# Patient Record
Sex: MALE | Race: WHITE | NOT HISPANIC OR LATINO | Employment: OTHER | URBAN - NONMETROPOLITAN AREA
[De-identification: names, ages, dates, MRNs, and addresses within clinical notes are randomized per-mention and may not be internally consistent; named-entity substitution may affect disease eponyms.]

---

## 2018-04-09 ENCOUNTER — OFFICE VISIT (OUTPATIENT)
Dept: CARDIOLOGY | Facility: CLINIC | Age: 83
End: 2018-04-09

## 2018-04-09 VITALS
HEART RATE: 64 BPM | SYSTOLIC BLOOD PRESSURE: 122 MMHG | BODY MASS INDEX: 30.34 KG/M2 | WEIGHT: 224 LBS | HEIGHT: 72 IN | DIASTOLIC BLOOD PRESSURE: 70 MMHG

## 2018-04-09 DIAGNOSIS — I25.6 SILENT MYOCARDIAL ISCHEMIA: ICD-10-CM

## 2018-04-09 DIAGNOSIS — I10 ESSENTIAL HYPERTENSION: Primary | ICD-10-CM

## 2018-04-09 DIAGNOSIS — E11.9 TYPE 2 DIABETES MELLITUS WITHOUT COMPLICATION, WITHOUT LONG-TERM CURRENT USE OF INSULIN (HCC): ICD-10-CM

## 2018-04-09 DIAGNOSIS — R06.02 SHORTNESS OF BREATH: ICD-10-CM

## 2018-04-09 DIAGNOSIS — R53.83 OTHER FATIGUE: ICD-10-CM

## 2018-04-09 DIAGNOSIS — N28.9 RENAL INSUFFICIENCY: ICD-10-CM

## 2018-04-09 DIAGNOSIS — E78.00 PURE HYPERCHOLESTEROLEMIA: ICD-10-CM

## 2018-04-09 DIAGNOSIS — E66.9 OBESITY (BMI 30.0-34.9): ICD-10-CM

## 2018-04-09 PROCEDURE — 99214 OFFICE O/P EST MOD 30 MIN: CPT | Performed by: NURSE PRACTITIONER

## 2018-04-09 RX ORDER — VALSARTAN 40 MG/1
40 TABLET ORAL DAILY
COMMUNITY
End: 2018-10-08

## 2018-04-09 RX ORDER — PRAVASTATIN SODIUM 40 MG
40 TABLET ORAL DAILY
COMMUNITY

## 2018-04-09 RX ORDER — FUROSEMIDE 40 MG/1
40 TABLET ORAL DAILY
COMMUNITY

## 2018-04-09 RX ORDER — CLONIDINE HYDROCHLORIDE 0.2 MG/1
0.2 TABLET ORAL 2 TIMES DAILY
COMMUNITY

## 2018-04-09 RX ORDER — PIOGLITAZONEHYDROCHLORIDE 45 MG/1
45 TABLET ORAL DAILY
COMMUNITY

## 2018-04-09 RX ORDER — LISINOPRIL 40 MG/1
40 TABLET ORAL DAILY
COMMUNITY
End: 2018-10-08 | Stop reason: SDUPTHER

## 2018-04-09 RX ORDER — AMLODIPINE BESYLATE 10 MG/1
10 TABLET ORAL DAILY
COMMUNITY
End: 2019-04-08 | Stop reason: ALTCHOICE

## 2018-04-18 ENCOUNTER — HOSPITAL ENCOUNTER (OUTPATIENT)
Dept: CARDIOLOGY | Facility: HOSPITAL | Age: 83
Discharge: HOME | End: 2018-04-18

## 2018-04-18 ENCOUNTER — OUTSIDE FACILITY SERVICE (OUTPATIENT)
Dept: CARDIOLOGY | Facility: CLINIC | Age: 83
End: 2018-04-18

## 2018-04-18 ENCOUNTER — DOCUMENTATION (OUTPATIENT)
Dept: CARDIOLOGY | Facility: HOSPITAL | Age: 83
End: 2018-04-18

## 2018-04-18 DIAGNOSIS — R06.02 SHORTNESS OF BREATH: ICD-10-CM

## 2018-04-18 DIAGNOSIS — I25.6 SILENT MYOCARDIAL ISCHEMIA: ICD-10-CM

## 2018-04-18 DIAGNOSIS — I10 ESSENTIAL HYPERTENSION: ICD-10-CM

## 2018-04-18 DIAGNOSIS — E11.9 TYPE 2 DIABETES MELLITUS WITHOUT COMPLICATION, WITHOUT LONG-TERM CURRENT USE OF INSULIN (HCC): ICD-10-CM

## 2018-04-18 LAB
MAXIMAL PREDICTED HEART RATE: 139 BPM
STRESS TARGET HR: 118 BPM

## 2018-04-18 PROCEDURE — 93306 TTE W/DOPPLER COMPLETE: CPT | Performed by: INTERNAL MEDICINE

## 2018-04-18 PROCEDURE — 93306 TTE W/DOPPLER COMPLETE: CPT

## 2018-04-18 PROCEDURE — 25010000002 REGADENOSON 0.4 MG/5ML SOLUTION

## 2018-04-18 NOTE — PROGRESS NOTES
PT PRESENTS TODAY (4/18/18) FOR NUCLEAR STRESS TEST.  PT UNABLE TO TOLERATE TEST DUE TO CLAUSTROPHOBIA.

## 2018-10-08 ENCOUNTER — OFFICE VISIT (OUTPATIENT)
Dept: CARDIOLOGY | Facility: CLINIC | Age: 83
End: 2018-10-08

## 2018-10-08 VITALS
SYSTOLIC BLOOD PRESSURE: 130 MMHG | WEIGHT: 225 LBS | HEART RATE: 52 BPM | DIASTOLIC BLOOD PRESSURE: 60 MMHG | HEIGHT: 72 IN | BODY MASS INDEX: 30.48 KG/M2

## 2018-10-08 DIAGNOSIS — E78.00 PURE HYPERCHOLESTEROLEMIA: ICD-10-CM

## 2018-10-08 DIAGNOSIS — E11.9 TYPE 2 DIABETES MELLITUS WITHOUT COMPLICATION, WITHOUT LONG-TERM CURRENT USE OF INSULIN (HCC): ICD-10-CM

## 2018-10-08 DIAGNOSIS — I10 ESSENTIAL HYPERTENSION: Primary | ICD-10-CM

## 2018-10-08 DIAGNOSIS — I49.3 PVC (PREMATURE VENTRICULAR CONTRACTION): ICD-10-CM

## 2018-10-08 PROCEDURE — 99213 OFFICE O/P EST LOW 20 MIN: CPT | Performed by: NURSE PRACTITIONER

## 2018-10-08 PROCEDURE — 93000 ELECTROCARDIOGRAM COMPLETE: CPT | Performed by: NURSE PRACTITIONER

## 2018-10-08 RX ORDER — LISINOPRIL 40 MG/1
40 TABLET ORAL DAILY
Qty: 135 TABLET | Refills: 3 | Status: SHIPPED | OUTPATIENT
Start: 2018-10-08 | End: 2019-04-08 | Stop reason: DRUGHIGH

## 2018-10-08 NOTE — PROGRESS NOTES
"Chief Complaint   Patient presents with   • Follow-up     Cardiac management. Last labs per PCP. PCP writes refills on medication. He reports was unable to tolerate stress test.   • Shortness of Breath     He states with exertion.   • Dizziness     Only if he gets up to fast.   • Irregular Heart Beat     Noted irregular rate when obtaining pulse.       Reginaldo Morales is an 83 y.o. male with HTN, hyperlipidemia, and DM.  In the past he reported chest pain and shortness of breath, but cardiac work up did not show any ischemia with normal LV function. He returned in April 2018 after not being seen since 2015. He complained of worsening shortness of breath with exertion and cardiac work up was recommended. He was unable to complete the stress test secondary to not being able to lie on the table. Echocardiogram was done which showed normal LVEF and PA pressure 34 mmHg. He came in today for his regular follow up. Overall, he feels stable. Denies any chest pain, does have mild shortness of breath with exertion. He has difficulty seeing secondary to macular degeneration and admits to \"not doing much.\" Labs are followed closely with Dr. Loya. Morning fasting glucoses run around 100. He does not know recent A1C or lipids but states Dr. Loya tells him everything looks good. His wife reports they received a letter recalling valsartan but has not stopped yet. He is also noted to be on ace. No refills needed.     HPI         Cardiac History:    Past Surgical History:   Procedure Laterality Date   • CARDIOVASCULAR STRESS TEST  07/24/2013    EF 60-65%, no ischemia   • CARDIOVASCULAR STRESS TEST  04/18/2018    Pt refused   • ECHO - CONVERTED  07/24/2013    EF 60-65%, LVH, mild MR, mild AR   • ECHO - CONVERTED  04/18/2018    EF 65%. RVSP- 34 mmHg   • OTHER SURGICAL HISTORY  07/18/2013    Overnight oximetry:  positive, low SP02 80%       Current Outpatient Prescriptions   Medication Sig Dispense Refill   • amLODIPine " (NORVASC) 10 MG tablet Take 10 mg by mouth Daily.     • CloNIDine (CATAPRES) 0.2 MG tablet Take 0.2 mg by mouth 2 (Two) Times a Day.     • furosemide (LASIX) 40 MG tablet Take 40 mg by mouth Daily.     • lisinopril (PRINIVIL,ZESTRIL) 40 MG tablet Take 1 tablet by mouth Daily. Increase to 40 mg in the morning and 20 mg at night 135 tablet 3   • pioglitazone (ACTOS) 45 MG tablet Take 45 mg by mouth Daily.     • pravastatin (PRAVACHOL) 40 MG tablet Take 40 mg by mouth Daily.     • sitaGLIPtin-metFORMIN (JANUMET)  MG per tablet Take 1 tablet by mouth 2 (Two) Times a Day With Meals.       No current facility-administered medications for this visit.        Penicillins    Past Medical History:   Diagnosis Date   • Basal cell adenoma    • Diabetes mellitus (CMS/HCC)    • Hx of cataract surgery     left eye   • Hydronephrosis    • Hyperlipidemia    • Hypertension    • Kidney atrophy     left   • Macular degeneration (senile) of retina    • Malignant neoplasm of prostate (CMS/HCC)    • Meatal stenosis        Social History     Social History   • Marital status:      Spouse name: N/A   • Number of children: N/A   • Years of education: N/A     Occupational History   • Not on file.     Social History Main Topics   • Smoking status: Former Smoker     Quit date: 4/9/1981   • Smokeless tobacco: Never Used   • Alcohol use No   • Drug use: No   • Sexual activity: Not on file     Other Topics Concern   • Not on file     Social History Narrative   • No narrative on file       Family History   Problem Relation Age of Onset   • Heart attack Father    • Diabetes Father        Review of Systems   Constitution: Positive for weight gain (up 1 lb). Negative for decreased appetite, weakness and malaise/fatigue.   HENT: Negative.    Eyes: Positive for vision loss in left eye and vision loss in right eye.   Cardiovascular: Positive for dyspnea on exertion (mild ) and leg swelling (more at the end of day). Negative for chest pain,  "claudication, palpitations and syncope.   Respiratory: Positive for shortness of breath (mild ). Negative for cough.    Endocrine: Negative.    Hematologic/Lymphatic: Negative.    Skin: Negative.    Musculoskeletal: Positive for arthritis and back pain. Negative for falls and myalgias.   Gastrointestinal: Positive for constipation (occasinoally, uses Miralax PRN). Negative for abdominal pain and melena.   Genitourinary: Negative for dysuria and hematuria.   Neurological: Positive for light-headedness (occasionally with standing ). Negative for dizziness.   Psychiatric/Behavioral: Negative for altered mental status and depression.   Allergic/Immunologic: Negative.       Diabetes- Yes  Thyroid-normal    Objective     /60 (BP Location: Right arm)   Pulse 52   Ht 182.9 cm (72.01\")   Wt 102 kg (225 lb)   BMI 30.51 kg/m²     Physical Exam   Constitutional: He is oriented to person, place, and time. He appears well-developed and well-nourished. No distress.   HENT:   Head: Normocephalic.   Eyes: Pupils are equal, round, and reactive to light.   Neck: Normal range of motion.   Cardiovascular: Normal rate, regular rhythm and intact distal pulses.   Occasional extrasystoles are present.   Murmur heard.  Pulmonary/Chest: Effort normal and breath sounds normal.   Abdominal: Soft. Bowel sounds are normal. He exhibits no distension.   Musculoskeletal: Normal range of motion. He exhibits edema (trace to 1+ bilateral LE edema).   Neurological: He is alert and oriented to person, place, and time.   Skin: Skin is warm and dry. He is not diaphoretic.   Psychiatric: He has a normal mood and affect.   Nursing note and vitals reviewed.       ECG 12 Lead  Date/Time: 10/8/2018 9:46 AM  Performed by: OWEN THOMPSON  Authorized by: OWEN THOMPSON   Previous ECG: no previous ECG available  Rhythm: sinus rhythm  Ectopy: PVCs  Rate: bradycardic  BPM: 52  Conduction: right bundle branch block  Comments:  ms   ms  One " PVC                Assessment/Plan    Heart rate and blood pressure stable. He is mildly bradycardic and irregular rhythm noted on clinical exam. EKG showed sinus bradycardia at 52 BPM, RBBB, and one PVC. He is advised to discontinue valsartan secondary to recall as well as concomitant treatment with ace inhibitor to prevent hyperkalemia. He is advised to increase lisinopril to 40 mg in am and 20 mg in pm. Refill sent for new dose. His wife will monitor blood pressure and let us know if not controlled. Amlodipine may be changed to Procardia or could consider adding hydralazine. He was advised to limit sodium as he does admit to adding extra salt. Could we get a copy of next labs? We reviewed the echocardiogram. He was unable to complete stress secondary to claustrophobia. He is advised to contact our office if he develops any new symptoms. Continue conservative management.He is not taking aspirin.  We will see him back in six months or sooner if needed.   Morgan was seen today for follow-up, shortness of breath, dizziness and irregular heart beat.    Diagnoses and all orders for this visit:    Essential hypertension    Pure hypercholesterolemia    Type 2 diabetes mellitus without complication, without long-term current use of insulin (CMS/[facility])    PVC (premature ventricular contraction)    Other orders  -     lisinopril (PRINIVIL,ZESTRIL) 40 MG tablet; Take 1 tablet by mouth Daily. Increase to 40 mg in the morning and 20 mg at night  -     ECG 12 Lead        Patient's Body mass index is 30.51 kg/m². BMI is above normal parameters. Recommendations include: nutrition counseling.               Electronically signed by LEE Coates,  October 8, 2018 9:54 AM

## 2019-04-08 ENCOUNTER — OFFICE VISIT (OUTPATIENT)
Dept: CARDIOLOGY | Facility: CLINIC | Age: 84
End: 2019-04-08

## 2019-04-08 VITALS
HEART RATE: 60 BPM | HEIGHT: 72 IN | SYSTOLIC BLOOD PRESSURE: 138 MMHG | DIASTOLIC BLOOD PRESSURE: 60 MMHG | BODY MASS INDEX: 31.15 KG/M2 | WEIGHT: 230 LBS

## 2019-04-08 DIAGNOSIS — I10 ESSENTIAL HYPERTENSION: ICD-10-CM

## 2019-04-08 DIAGNOSIS — I49.3 PVC (PREMATURE VENTRICULAR CONTRACTION): Primary | ICD-10-CM

## 2019-04-08 DIAGNOSIS — E11.9 TYPE 2 DIABETES MELLITUS WITHOUT COMPLICATION, WITHOUT LONG-TERM CURRENT USE OF INSULIN (HCC): ICD-10-CM

## 2019-04-08 DIAGNOSIS — E78.00 PURE HYPERCHOLESTEROLEMIA: ICD-10-CM

## 2019-04-08 DIAGNOSIS — G47.34 NOCTURNAL OXYGEN DESATURATION: ICD-10-CM

## 2019-04-08 DIAGNOSIS — E66.9 OBESITY (BMI 30.0-34.9): ICD-10-CM

## 2019-04-08 DIAGNOSIS — R40.0 HAS DAYTIME DROWSINESS: ICD-10-CM

## 2019-04-08 PROCEDURE — 99213 OFFICE O/P EST LOW 20 MIN: CPT | Performed by: NURSE PRACTITIONER

## 2019-04-08 RX ORDER — NIFEDIPINE 60 MG/1
60 TABLET, EXTENDED RELEASE ORAL DAILY
COMMUNITY

## 2019-04-08 RX ORDER — LISINOPRIL 40 MG/1
40 TABLET ORAL DAILY
COMMUNITY
End: 2019-10-10 | Stop reason: SDUPTHER

## 2019-04-08 NOTE — PROGRESS NOTES
Chief Complaint   Patient presents with   • Follow-up     For cardiac management. Patient is not on aspirin. Last lab was done a couple of months ago per PCP, not in chart. Reports that he sleeps a lot.   • Med Refill     PCP does refills as well.       Cardiac Complaints  none      Subjective   Morgan Morales is a 84 y.o. male with HTN, hyperlipidemia, and DM.  In the past he reported chest pain and shortness of breath, but cardiac work up did not show any ischemia with normal LV function. He returned in April 2018 after not being seen since 2015. He complained of worsening shortness of breath with exertion and cardiac work up was recommended. He was unable to complete the stress test secondary to not being able to lie on the table. Echocardiogram was done which showed normal LVEF and PA pressure 34 mmHg. He returns today for follow up and denies any cardiac concerns.  He denies any chest pain, shortness of breath, and dizziness.  Patient does report sleeping quite a bit and having daytime drowsiness but admits this has been the same for sometime.  He does have nocturnal desaturation and wears oxygen at night but no CPAP.  He does not think he has been diagnosed with sleep apnea.  Labs he admits are done with PCP and were done a few months ago, no copy available.  No refills needed as he admits they are done with PCP.      Cardiac History  Past Surgical History:   Procedure Laterality Date   • CARDIOVASCULAR STRESS TEST  07/24/2013    EF 60-65%, no ischemia   • CARDIOVASCULAR STRESS TEST  04/18/2018    Pt refused   • ECHO - CONVERTED  07/24/2013    EF 60-65%, LVH, mild MR, mild AR   • ECHO - CONVERTED  04/18/2018    EF 65%. RVSP- 34 mmHg   • OTHER SURGICAL HISTORY  07/18/2013    Overnight oximetry:  positive, low SP02 80%       Current Outpatient Medications   Medication Sig Dispense Refill   • CloNIDine (CATAPRES) 0.2 MG tablet Take 0.2 mg by mouth 2 (Two) Times a Day.     • furosemide (LASIX) 40 MG tablet Take 40  mg by mouth Daily.     • lisinopril (PRINIVIL,ZESTRIL) 40 MG tablet Take 40 mg by mouth Daily.     • NIFEdipine XL (PROCARDIA XL) 60 MG 24 hr tablet Take 60 mg by mouth Daily.     • pioglitazone (ACTOS) 45 MG tablet Take 45 mg by mouth Daily.     • pravastatin (PRAVACHOL) 40 MG tablet Take 40 mg by mouth Daily.     • sitaGLIPtin-metFORMIN (JANUMET)  MG per tablet Take 1 tablet by mouth 2 (Two) Times a Day With Meals.       No current facility-administered medications for this visit.        Penicillins    Past Medical History:   Diagnosis Date   • Basal cell adenoma    • Diabetes mellitus (CMS/HCC)    • Hx of cataract surgery     left eye   • Hydronephrosis    • Hyperlipidemia    • Hypertension    • Kidney atrophy     left   • Macular degeneration (senile) of retina    • Malignant neoplasm of prostate (CMS/HCC)    • Meatal stenosis        Social History     Socioeconomic History   • Marital status:      Spouse name: Not on file   • Number of children: Not on file   • Years of education: Not on file   • Highest education level: Not on file   Tobacco Use   • Smoking status: Former Smoker     Last attempt to quit: 1981     Years since quittin.0   • Smokeless tobacco: Never Used   Substance and Sexual Activity   • Alcohol use: No   • Drug use: No       Family History   Problem Relation Age of Onset   • Heart attack Father    • Diabetes Father        Review of Systems   Constitution: Positive for malaise/fatigue. Negative for weakness.   Cardiovascular: Negative for chest pain, claudication, dyspnea on exertion, irregular heartbeat, near-syncope, orthopnea, palpitations and syncope.   Respiratory: Negative for cough, shortness of breath and wheezing.    Musculoskeletal: Negative for back pain, joint pain and joint swelling.   Gastrointestinal: Negative for anorexia, heartburn, nausea and vomiting.   Genitourinary: Negative for dysuria, hematuria, hesitancy and nocturia.   Neurological: Positive for  "excessive daytime sleepiness. Negative for dizziness, light-headedness and loss of balance.   Psychiatric/Behavioral: Negative for depression and memory loss. The patient is not nervous/anxious.            Objective     /60 (BP Location: Left arm)   Pulse 60   Ht 182.9 cm (72.01\")   Wt 104 kg (230 lb)   BMI 31.19 kg/m²     Physical Exam   Constitutional: He is oriented to person, place, and time. He appears well-developed and well-nourished.   HENT:   Head: Normocephalic and atraumatic.   Eyes: EOM are normal. Pupils are equal, round, and reactive to light.   Neck: Normal range of motion. Neck supple.   Cardiovascular: Normal rate and regular rhythm.   Murmur heard.  Pulmonary/Chest: Effort normal and breath sounds normal.   Abdominal: Soft.   Musculoskeletal: Normal range of motion.   Neurological: He is alert and oriented to person, place, and time.   Skin: Skin is warm and dry.   Psychiatric: He has a normal mood and affect. His behavior is normal.       Procedures    Assessment/Plan     HR and BP are stable today.  HTN well managed on current, same advised.  Palpitations denied at present, no additional medication recommended for management but patient was encouraged to limit his caffeine.  He does admit to daytime drowiness and fatigue and did have a positive overnight years ago with low SP02 of 80% noted.  He wears oxygen at night but does not use CPAP and denies sleep study.  Patient encouraged to discuss with your office.  Labs including his FLP for hyperlipidemia management and pravachol efficacy as well as AIC for DM management done with your office.  Could we have next for review?  No refills currently needed as he reports with your office.  No repeat cardiac workup recommended as cardiac symptoms are denied.  BMI elevated at 31.19 and patient does admit to high carb diet.  Good cardiac ADA diet with limited carbs/sugars/and caloric intake encouraged.  6 month follow up scheduled or sooner if " needed.         Problems Addressed this Visit        Cardiovascular and Mediastinum    Essential hypertension    Relevant Medications    lisinopril (PRINIVIL,ZESTRIL) 40 MG tablet    NIFEdipine XL (PROCARDIA XL) 60 MG 24 hr tablet    Pure hypercholesterolemia    PVC (premature ventricular contraction) - Primary    Relevant Medications    NIFEdipine XL (PROCARDIA XL) 60 MG 24 hr tablet       Endocrine    Type 2 diabetes mellitus without complication, without long-term current use of insulin (CMS/[facility])      Other Visit Diagnoses     Nocturnal oxygen desaturation        Obesity (BMI 30.0-34.9)        Has daytime drowsiness              Patient's Body mass index is 31.19 kg/m². BMI is above normal parameters. Recommendations include: nutrition counseling.            Electronically signed by LEE Boudreaux April 8, 2019 4:41 PM

## 2019-10-10 RX ORDER — LISINOPRIL 40 MG/1
40 TABLET ORAL DAILY
Qty: 30 TABLET | Refills: 0 | Status: SHIPPED | OUTPATIENT
Start: 2019-10-10 | End: 2019-12-10 | Stop reason: SDUPTHER

## 2019-11-07 RX ORDER — LISINOPRIL 40 MG/1
TABLET ORAL
Qty: 30 TABLET | Refills: 0 | OUTPATIENT
Start: 2019-11-07

## 2019-12-10 ENCOUNTER — OFFICE VISIT (OUTPATIENT)
Dept: CARDIOLOGY | Facility: CLINIC | Age: 84
End: 2019-12-10

## 2019-12-10 VITALS
SYSTOLIC BLOOD PRESSURE: 150 MMHG | HEIGHT: 72 IN | HEART RATE: 68 BPM | DIASTOLIC BLOOD PRESSURE: 72 MMHG | WEIGHT: 230.8 LBS | BODY MASS INDEX: 31.26 KG/M2

## 2019-12-10 DIAGNOSIS — E78.00 PURE HYPERCHOLESTEROLEMIA: ICD-10-CM

## 2019-12-10 DIAGNOSIS — I49.3 PVC (PREMATURE VENTRICULAR CONTRACTION): ICD-10-CM

## 2019-12-10 DIAGNOSIS — E66.9 OBESITY (BMI 30.0-34.9): ICD-10-CM

## 2019-12-10 DIAGNOSIS — I10 ESSENTIAL HYPERTENSION: ICD-10-CM

## 2019-12-10 DIAGNOSIS — E11.9 TYPE 2 DIABETES MELLITUS WITHOUT COMPLICATION, WITHOUT LONG-TERM CURRENT USE OF INSULIN (HCC): ICD-10-CM

## 2019-12-10 DIAGNOSIS — G47.34 NOCTURNAL OXYGEN DESATURATION: ICD-10-CM

## 2019-12-10 PROCEDURE — 99213 OFFICE O/P EST LOW 20 MIN: CPT | Performed by: NURSE PRACTITIONER

## 2019-12-10 RX ORDER — LISINOPRIL 40 MG/1
40 TABLET ORAL DAILY
Qty: 30 TABLET | Refills: 8 | Status: SHIPPED | OUTPATIENT
Start: 2019-12-10 | End: 2019-12-11 | Stop reason: SDUPTHER

## 2019-12-10 NOTE — PROGRESS NOTES
Chief Complaint   Patient presents with   • Follow-up     cardiac management. Had labs per PCP August 0-[id number] 2019.. No cardiac complaints today   • Edema     Has mild  edema to ankles .   • Med Refill     Needs refills onLisinopril -90 day supply to [facility] pharmacy       Subjective       Morgan Morales is a 84 y.o. male  with HTN, hyperlipidemia, and DM.  In the past he reported chest pain and shortness of breath, but cardiac work up did not show any ischemia with normal LV function. He returned in April 2018 after not being seen since 2015. He complained of worsening shortness of breath with exertion and cardiac work up was recommended. He was unable to complete the stress test secondary to not being able to lie on the table. Echocardiogram was done which showed normal LVEF and PA pressure 34 mmHg. He does have nocturnal desaturation and wears oxygen at night but no CPAP.    Today he comes to the office for a follow up visit accompanied by his wife.  He denies chest pain or palpitations.  No issues with increased shortness of breath, dizziness, or increased swelling.  He maintains his normal daily activities.  No recent change in cardiac medications noted.  His wife feels he is doing fairly well.  Some days his blood sugar is higher than others. This morning 123.     HPI     Cardiac History:    Past Surgical History:   Procedure Laterality Date   • CARDIOVASCULAR STRESS TEST  07/24/2013    EF 60-65%, no ischemia   • CARDIOVASCULAR STRESS TEST  04/18/2018    Pt refused   • ECHO - CONVERTED  07/24/2013    EF 60-65%, LVH, mild MR, mild AR   • ECHO - CONVERTED  04/18/2018    EF 65%. RVSP- 34 mmHg   • OTHER SURGICAL HISTORY  07/18/2013    Overnight oximetry:  positive, low SP02 80%       Current Outpatient Medications   Medication Sig Dispense Refill   • CloNIDine (CATAPRES) 0.2 MG tablet Take 0.2 mg by mouth 2 (Two) Times a Day.     • furosemide (LASIX) 40 MG tablet Take 40 mg by mouth Daily.     •  lisinopril (PRINIVIL,ZESTRIL) 40 MG tablet Take 1 tablet by mouth Daily. 30 tablet 8   • NIFEdipine XL (PROCARDIA XL) 60 MG 24 hr tablet Take 60 mg by mouth Daily.     • pioglitazone (ACTOS) 45 MG tablet Take 45 mg by mouth Daily.     • pravastatin (PRAVACHOL) 40 MG tablet Take 40 mg by mouth Daily.     • sitaGLIPtin-metFORMIN (JANUMET)  MG per tablet Take 1 tablet by mouth 2 (Two) Times a Day With Meals.       No current facility-administered medications for this visit.        Penicillins    Past Medical History:   Diagnosis Date   • Basal cell adenoma    • Diabetes mellitus (CMS/HCC)    • Hx of cataract surgery     left eye   • Hydronephrosis    • Hyperlipidemia    • Hypertension    • Kidney atrophy     left   • Macular degeneration (senile) of retina    • Malignant neoplasm of prostate (CMS/HCC)    • Meatal stenosis        Social History     Socioeconomic History   • Marital status:      Spouse name: Not on file   • Number of children: Not on file   • Years of education: Not on file   • Highest education level: Not on file   Tobacco Use   • Smoking status: Former Smoker     Last attempt to quit: 1981     Years since quittin.6   • Smokeless tobacco: Never Used   Substance and Sexual Activity   • Alcohol use: No   • Drug use: No       Family History   Problem Relation Age of Onset   • Heart attack Father    • Diabetes Father        Review of Systems   Constitution: Positive for malaise/fatigue (same). Negative for decreased appetite, diaphoresis and fever.   HENT: Positive for hearing loss. Negative for congestion, hoarse voice and nosebleeds.    Eyes: Negative for redness and visual disturbance.   Cardiovascular: Positive for leg swelling (mild, lower legs, not a new problem). Negative for chest pain and near-syncope.   Respiratory: Positive for shortness of breath and sleep disturbances due to breathing (uses oxygen).    Endocrine: Negative for polydipsia, polyphagia and polyuria.  "  Hematologic/Lymphatic: Negative for bleeding problem. Does not bruise/bleed easily.   Skin: Positive for dry skin and itching. Negative for flushing and rash.   Musculoskeletal: Positive for arthritis. Negative for falls, muscle cramps and muscle weakness.   Gastrointestinal: Negative for abdominal pain, dysphagia and melena.   Genitourinary: Negative for dysuria and hematuria.   Psychiatric/Behavioral: Positive for memory loss (\"forgetful\"). Negative for altered mental status. The patient is not nervous/anxious.    Allergic/Immunologic: Negative for hives.        Objective     /72 (BP Location: Left arm)   Pulse 68   Ht 182.9 cm (72.01\")   Wt 105 kg (230 lb 12.8 oz)   BMI 31.29 kg/m²     Physical Exam   Constitutional: He is oriented to person, place, and time. He appears well-nourished.   HENT:   Head: Normocephalic.   Eyes: Pupils are equal, round, and reactive to light. Conjunctivae are normal.   Neck: Normal range of motion. Neck supple. Carotid bruit is not present.   Cardiovascular: Normal rate, regular rhythm, S1 normal and S2 normal.   No murmur heard.  Pulses:       Radial pulses are 2+ on the right side, and 2+ on the left side.        Posterior tibial pulses are 1+ on the right side, and 1+ on the left side.   Pulmonary/Chest: Breath sounds normal. He has no wheezes. He has no rales.   Abdominal: Soft. Bowel sounds are normal. There is no tenderness.   Musculoskeletal: Normal range of motion. He exhibits edema (mild in ankles). He exhibits no tenderness.   Neurological: He is alert and oriented to person, place, and time.   Skin: Skin is warm and dry.   Psychiatric: He has a normal mood and affect.      Procedures: none today         Assessment/Plan      Morgan was seen today for follow-up, edema and med refill.    Diagnoses and all orders for this visit:    Essential hypertension  -     Discontinue: lisinopril (PRINIVIL,ZESTRIL) 40 MG tablet; Take 1 tablet by mouth Daily. PATIENT NEEDS " "APPOINTMENT FOR ANY FURTHER REFILLS  -     lisinopril (PRINIVIL,ZESTRIL) 40 MG tablet; Take 1 tablet by mouth Daily.    Pure hypercholesterolemia    PVC (premature ventricular contraction)    Type 2 diabetes mellitus without complication, without long-term current use of insulin (CMS/[facility])    Obesity (BMI 30.0-34.9)    Nocturnal oxygen desaturation      Hypertension- blood pressure stable.  Advised to continue same dose of Procardia XL being 60 mg daily, clonidine 0.2 mg twice a day, Lasix 40 daily, and lisinopril 40 mg daily.  Refills given for lisinopril.  According to patient his most recent labs were \"good\".  Please forward a copy of lab results as to monitor renal function and medication management.    Hypercholesterolemia- currently on Pravachol without issues noted.  He will follow with you for lipid panel orders and management.    Diabetes/obesity-patient's Body mass index is 31.29 kg/m². BMI is above normal parameters. Recommendations include: none (medical contraindication) and nutrition counseling. Diabetic diet encouraged.  Also encouraged him on walking for exercise which will help with management of diabetes and promote weight loss.     Nocturnal desaturation- continues to use supplemental oxygen at night with benefit.     From a cardiac standpoint Mr. Morales appears stable at this time.  No repeat testing ordered.  A 6-month follow-up visit scheduled.  Please call sooner for any cardiac concerns.           Electronically signed by ELE Ball,  December 11, 2019 8:06 AM  "

## 2019-12-11 RX ORDER — LISINOPRIL 40 MG/1
40 TABLET ORAL DAILY
Qty: 30 TABLET | Refills: 8
Start: 2019-12-11 | End: 2020-06-23 | Stop reason: SDUPTHER

## 2020-06-23 ENCOUNTER — OFFICE VISIT (OUTPATIENT)
Dept: CARDIOLOGY | Facility: CLINIC | Age: 85
End: 2020-06-23

## 2020-06-23 VITALS
TEMPERATURE: 97.8 F | DIASTOLIC BLOOD PRESSURE: 70 MMHG | WEIGHT: 242.4 LBS | BODY MASS INDEX: 32.83 KG/M2 | HEIGHT: 72 IN | HEART RATE: 56 BPM | SYSTOLIC BLOOD PRESSURE: 158 MMHG

## 2020-06-23 DIAGNOSIS — M79.10 MYALGIA: ICD-10-CM

## 2020-06-23 DIAGNOSIS — R60.0 BILATERAL LEG EDEMA: ICD-10-CM

## 2020-06-23 DIAGNOSIS — E78.00 PURE HYPERCHOLESTEROLEMIA: Primary | ICD-10-CM

## 2020-06-23 DIAGNOSIS — R06.02 SOB (SHORTNESS OF BREATH): ICD-10-CM

## 2020-06-23 DIAGNOSIS — I10 ESSENTIAL HYPERTENSION: ICD-10-CM

## 2020-06-23 DIAGNOSIS — I49.3 PVC (PREMATURE VENTRICULAR CONTRACTION): ICD-10-CM

## 2020-06-23 DIAGNOSIS — E11.9 TYPE 2 DIABETES MELLITUS WITHOUT COMPLICATION, WITHOUT LONG-TERM CURRENT USE OF INSULIN (HCC): ICD-10-CM

## 2020-06-23 PROCEDURE — 99214 OFFICE O/P EST MOD 30 MIN: CPT | Performed by: NURSE PRACTITIONER

## 2020-06-23 RX ORDER — LISINOPRIL 40 MG/1
40 TABLET ORAL DAILY
Qty: 90 TABLET | Refills: 3 | Status: SHIPPED | OUTPATIENT
Start: 2020-06-23 | End: 2021-07-26 | Stop reason: SDUPTHER

## 2020-06-23 NOTE — PROGRESS NOTES
Chief Complaint   Patient presents with   • Follow-up     Cardiac management.   • Lab     Thinks last labs 12/2019 or 1/2020 per PCP.   • Shortness of Breath     Having while walking, feels could be related to gaining weight.   • Med Refill     Needs refills on Lisinopril-90 day.       Subjective       Morgan Morales is a 85 y.o. male with HTN, hyperlipidemia, and DM.  In the past he reported chest pain and shortness of breath, but cardiac work up did not show any ischemia with normal LV function. He returned in April 2018 after not being seen since 2015. He complained of worsening shortness of breath with exertion and cardiac work up was recommended. He was unable to complete the stress test secondary to not being able to lie on the table. Echocardiogram was done which showed normal LVEF and PA pressure 34 mmHg. He does have nocturnal desaturation and wears oxygen at night but no CPAP.    He came today for follow up with his wife. He denies chest pain or shortness of breath at rest. He notices more dyspnea on exertion, especially after walking to his mailbox. He admits to being very sedentary and not watching his diet. He is consuming more sugary soda and candy bars. His wife also says he adds a lot of salt to his food. His weight has increased 12 pounds since last visit. LE edema is chief complaint. He sits most of the day and only occasionally props feet. He has not been to see Dr. Loya recently due to Covid-19, no recent labs. Unsure about diabetes control but c/o feet tingling with walking and legs cramping. Wife says blood pressure normal at home.     HPI         Cardiac History:    Past Surgical History:   Procedure Laterality Date   • CARDIOVASCULAR STRESS TEST  07/24/2013    EF 60-65%, no ischemia   • CARDIOVASCULAR STRESS TEST  04/18/2018    Pt refused   • ECHO - CONVERTED  07/24/2013    EF 60-65%, LVH, mild MR, mild AR   • ECHO - CONVERTED  04/18/2018    EF 65%. RVSP- 34 mmHg   • OTHER SURGICAL HISTORY   2013    Overnight oximetry:  positive, low SP02 80%     Current Outpatient Medications   Medication Sig Dispense Refill   • CloNIDine (CATAPRES) 0.2 MG tablet Take 0.2 mg by mouth 2 (Two) Times a Day.     • furosemide (LASIX) 40 MG tablet Take 40 mg by mouth Daily.     • lisinopril (PRINIVIL,ZESTRIL) 40 MG tablet Take 1 tablet by mouth Daily. 90 tablet 3   • NIFEdipine XL (PROCARDIA XL) 60 MG 24 hr tablet Take 60 mg by mouth Daily.     • pioglitazone (ACTOS) 45 MG tablet Take 45 mg by mouth Daily.     • pravastatin (PRAVACHOL) 40 MG tablet Take 40 mg by mouth Daily.     • sitaGLIPtin-metFORMIN (JANUMET)  MG per tablet Take 1 tablet by mouth 2 (Two) Times a Day With Meals.       No current facility-administered medications for this visit.      Penicillins    Past Medical History:   Diagnosis Date   • Basal cell adenoma    • Diabetes mellitus (CMS/HCC)    • Hx of cataract surgery     left eye   • Hydronephrosis    • Hyperlipidemia    • Hypertension    • Kidney atrophy     left   • Macular degeneration (senile) of retina    • Malignant neoplasm of prostate (CMS/HCC)    • Meatal stenosis      Social History     Socioeconomic History   • Marital status:      Spouse name: Not on file   • Number of children: Not on file   • Years of education: Not on file   • Highest education level: Not on file   Tobacco Use   • Smoking status: Former Smoker     Last attempt to quit: 1981     Years since quittin.2   • Smokeless tobacco: Never Used   Substance and Sexual Activity   • Alcohol use: No   • Drug use: No       Family History   Problem Relation Age of Onset   • Heart attack Father    • Diabetes Father        Review of Systems   Constitution: Positive for weight gain (up 12 lb ). Negative for decreased appetite and malaise/fatigue.   HENT: Negative.    Eyes: Positive for vision loss in left eye and vision loss in right eye.   Cardiovascular: Positive for dyspnea on exertion and leg swelling. Negative  "for chest pain, orthopnea, palpitations and syncope.   Respiratory: Positive for sleep disturbances due to breathing (wears O2). Negative for cough and shortness of breath.    Endocrine: Negative.    Hematologic/Lymphatic: Negative.    Skin: Negative.    Musculoskeletal: Positive for arthritis, muscle cramps, myalgias and stiffness. Negative for falls.   Gastrointestinal: Negative for abdominal pain and melena.   Genitourinary: Negative for dysuria and hematuria.   Neurological: Positive for loss of balance and sensory change (tingling, prickling sensation to feet with walking ). Negative for dizziness.   Psychiatric/Behavioral: Negative for altered mental status and depression.   Allergic/Immunologic: Negative.      Diabetes- Yes  Thyroid-normal    Objective     /70 (BP Location: Right arm)   Pulse 56   Temp 97.8 °F (36.6 °C)   Ht 182.9 cm (72.01\")   Wt 110 kg (242 lb 6.4 oz)   BMI 32.87 kg/m²     Physical Exam   Constitutional: He is oriented to person, place, and time. He appears well-developed and well-nourished. No distress.   HENT:   Head: Normocephalic.   Eyes: Pupils are equal, round, and reactive to light.   Neck: Normal range of motion.   Cardiovascular: Normal rate, regular rhythm, intact distal pulses and normal pulses.   Murmur heard.   Systolic murmur is present with a grade of 1/6 at the apex.  Pulses:       Dorsalis pedis pulses are 2+ on the right side, and 2+ on the left side.   Pulmonary/Chest: Effort normal and breath sounds normal. No respiratory distress. He has no wheezes. He has no rales.   Abdominal: Soft. Bowel sounds are normal.   lg/rotund   Musculoskeletal: Normal range of motion. He exhibits edema (1+ bilateral LE edema).   Neurological: He is alert and oriented to person, place, and time.   Skin: Skin is warm and dry. He is not diaphoretic.   Psychiatric: He has a normal mood and affect.   Nursing note and vitals reviewed.     Procedures          Assessment/Plan      Eulis " was seen today for follow-up, lab, shortness of breath and med refill.    Diagnoses and all orders for this visit:    Pure hypercholesterolemia  -     Lipid Panel; Future  -     CBC (No Diff); Future    Essential hypertension  -     lisinopril (PRINIVIL,ZESTRIL) 40 MG tablet; Take 1 tablet by mouth Daily.  -     Comprehensive Metabolic Panel; Future  -     TSH; Future  -     CBC (No Diff); Future    PVC (premature ventricular contraction)  -     Comprehensive Metabolic Panel; Future  -     TSH; Future  -     CBC (No Diff); Future  -     Magnesium; Future    Type 2 diabetes mellitus without complication, without long-term current use of insulin (CMS/[facility])  -     Comprehensive Metabolic Panel; Future  -     Hemoglobin A1c; Future  -     CBC (No Diff); Future    SOB (shortness of breath)  -     Comprehensive Metabolic Panel; Future  -     proBNP; Future  -     CBC (No Diff); Future    Bilateral leg edema  -     Comprehensive Metabolic Panel; Future  -     proBNP; Future    Myalgia  -     CK; Future    1. HTN- slightly elevated today at 158/70. I did not change his medications as his wife says he is well controlled at home. He is high risk for fall secondary to impaired vision and diabetic neuropathy. Will avoid hypotension.     2. Hypercholesterolemia- managed with pravastatin. Will recheck lipids as well as CK level as he admits to muscle cramping and weakness.     3. PVC- none detected on clinical exam today.     4. Diabetes- patient does not monitor sugar at home regularly. He is on oral antidiabetic agents. Admits to not following diabetic diet all the time. Encouraged to limit CHO, candy bars, soda.     5. Edema- may be r/t Procardia vs high sodium diet/prolonged sitting. Will check BNP to rule out volume overload. He is advised to elevate legs while sitting. Use of compression stockings encouraged. Limit sodium to 1500 mg daily. He has excellent pedal pulses. Judicious use of extra 20 mg Lasix PRN recommended. Cr  was 1.5 on previous labs and he has only one kidney. Will not over diurese.     Further recommendations to follow. Refills sent for lisinopril. Labs will be forwarded when available. Follow up in six months. He continues to decline repeating the stress test.     Patient's Body mass index is 32.87 kg/m². BMI is above normal parameters. Recommendations include: nutrition counseling.               Electronically signed by LEE Coates,  June 23, 2020 11:17

## 2020-06-29 ENCOUNTER — LAB (OUTPATIENT)
Dept: LAB | Facility: HOSPITAL | Age: 85
End: 2020-06-29

## 2020-06-29 DIAGNOSIS — M79.10 MYALGIA: ICD-10-CM

## 2020-06-29 DIAGNOSIS — R06.02 SOB (SHORTNESS OF BREATH): ICD-10-CM

## 2020-06-29 DIAGNOSIS — E11.9 TYPE 2 DIABETES MELLITUS WITHOUT COMPLICATION, WITHOUT LONG-TERM CURRENT USE OF INSULIN (HCC): ICD-10-CM

## 2020-06-29 DIAGNOSIS — E78.00 PURE HYPERCHOLESTEROLEMIA: ICD-10-CM

## 2020-06-29 DIAGNOSIS — I49.3 PVC (PREMATURE VENTRICULAR CONTRACTION): ICD-10-CM

## 2020-06-29 DIAGNOSIS — R60.0 BILATERAL LEG EDEMA: ICD-10-CM

## 2020-06-29 DIAGNOSIS — I10 ESSENTIAL HYPERTENSION: ICD-10-CM

## 2020-06-29 LAB
ALBUMIN SERPL-MCNC: 4.07 G/DL (ref 3.5–5.2)
ALBUMIN/GLOB SERPL: 1.2 G/DL
ALP SERPL-CCNC: 70 U/L (ref 39–117)
ALT SERPL W P-5'-P-CCNC: 9 U/L (ref 1–41)
ANION GAP SERPL CALCULATED.3IONS-SCNC: 13.6 MMOL/L (ref 5–15)
AST SERPL-CCNC: 13 U/L (ref 1–40)
BILIRUB SERPL-MCNC: 0.4 MG/DL (ref 0.2–1.2)
BUN SERPL-MCNC: 37 MG/DL (ref 8–23)
BUN/CREAT SERPL: 18.7 (ref 7–25)
CALCIUM SPEC-SCNC: 9.8 MG/DL (ref 8.6–10.5)
CHLORIDE SERPL-SCNC: 105 MMOL/L (ref 98–107)
CHOLEST SERPL-MCNC: 162 MG/DL (ref 0–200)
CK SERPL-CCNC: 73 U/L (ref 20–200)
CO2 SERPL-SCNC: 25.4 MMOL/L (ref 22–29)
CREAT SERPL-MCNC: 1.98 MG/DL (ref 0.76–1.27)
DEPRECATED RDW RBC AUTO: 52.1 FL (ref 37–54)
ERYTHROCYTE [DISTWIDTH] IN BLOOD BY AUTOMATED COUNT: 14.8 % (ref 12.3–15.4)
GFR SERPL CREATININE-BSD FRML MDRD: 32 ML/MIN/1.73
GLOBULIN UR ELPH-MCNC: 3.3 GM/DL
GLUCOSE SERPL-MCNC: 146 MG/DL (ref 65–99)
HBA1C MFR BLD: 7.1 % (ref 4.8–5.6)
HCT VFR BLD AUTO: 47.4 % (ref 37.5–51)
HDLC SERPL-MCNC: 40 MG/DL (ref 40–60)
HGB BLD-MCNC: 14.1 G/DL (ref 13–17.7)
LDLC SERPL CALC-MCNC: 94 MG/DL (ref 0–100)
LDLC/HDLC SERPL: 2.36 {RATIO}
MAGNESIUM SERPL-MCNC: 2.2 MG/DL (ref 1.6–2.4)
MCH RBC QN AUTO: 28.4 PG (ref 26.6–33)
MCHC RBC AUTO-ENTMCNC: 29.7 G/DL (ref 31.5–35.7)
MCV RBC AUTO: 95.4 FL (ref 79–97)
NT-PROBNP SERPL-MCNC: 592.1 PG/ML (ref 5–1800)
PLATELET # BLD AUTO: 181 10*3/MM3 (ref 140–450)
PMV BLD AUTO: 12.9 FL (ref 6–12)
POTASSIUM SERPL-SCNC: 4.4 MMOL/L (ref 3.5–5.2)
PROT SERPL-MCNC: 7.4 G/DL (ref 6–8.5)
RBC # BLD AUTO: 4.97 10*6/MM3 (ref 4.14–5.8)
SODIUM SERPL-SCNC: 144 MMOL/L (ref 136–145)
TRIGL SERPL-MCNC: 139 MG/DL (ref 0–150)
TSH SERPL DL<=0.05 MIU/L-ACNC: 5.68 UIU/ML (ref 0.27–4.2)
VLDLC SERPL-MCNC: 27.8 MG/DL
WBC # BLD AUTO: 9.93 10*3/MM3 (ref 3.4–10.8)

## 2020-06-29 PROCEDURE — 85027 COMPLETE CBC AUTOMATED: CPT | Performed by: NURSE PRACTITIONER

## 2020-06-29 PROCEDURE — 82550 ASSAY OF CK (CPK): CPT | Performed by: NURSE PRACTITIONER

## 2020-06-29 PROCEDURE — 84443 ASSAY THYROID STIM HORMONE: CPT | Performed by: NURSE PRACTITIONER

## 2020-06-29 PROCEDURE — 83735 ASSAY OF MAGNESIUM: CPT | Performed by: NURSE PRACTITIONER

## 2020-06-29 PROCEDURE — 36415 COLL VENOUS BLD VENIPUNCTURE: CPT

## 2020-06-29 PROCEDURE — 83036 HEMOGLOBIN GLYCOSYLATED A1C: CPT | Performed by: NURSE PRACTITIONER

## 2020-06-29 PROCEDURE — 80061 LIPID PANEL: CPT | Performed by: NURSE PRACTITIONER

## 2020-06-29 PROCEDURE — 80053 COMPREHEN METABOLIC PANEL: CPT | Performed by: NURSE PRACTITIONER

## 2020-06-29 PROCEDURE — 83880 ASSAY OF NATRIURETIC PEPTIDE: CPT | Performed by: NURSE PRACTITIONER

## 2021-07-26 DIAGNOSIS — I10 ESSENTIAL HYPERTENSION: ICD-10-CM

## 2021-07-26 RX ORDER — LISINOPRIL 40 MG/1
40 TABLET ORAL DAILY
Qty: 30 TABLET | Refills: 0 | Status: SHIPPED | OUTPATIENT
Start: 2021-07-26

## 2022-02-10 DIAGNOSIS — I10 ESSENTIAL HYPERTENSION: ICD-10-CM

## 2022-02-16 RX ORDER — LISINOPRIL 40 MG/1
TABLET ORAL
Qty: 30 TABLET | Refills: 0 | OUTPATIENT
Start: 2022-02-16

## 2022-11-04 NOTE — PROGRESS NOTES
Echo looks good, awaiting stress.  Just trivial pericardial effusion, he is taking lasix already.
yes
yes